# Patient Record
Sex: MALE | Race: BLACK OR AFRICAN AMERICAN | NOT HISPANIC OR LATINO | Employment: FULL TIME | URBAN - METROPOLITAN AREA
[De-identification: names, ages, dates, MRNs, and addresses within clinical notes are randomized per-mention and may not be internally consistent; named-entity substitution may affect disease eponyms.]

---

## 2021-08-10 ENCOUNTER — HOSPITAL ENCOUNTER (EMERGENCY)
Facility: MEDICAL CENTER | Age: 51
End: 2021-08-10
Attending: EMERGENCY MEDICINE
Payer: COMMERCIAL

## 2021-08-10 VITALS
SYSTOLIC BLOOD PRESSURE: 113 MMHG | DIASTOLIC BLOOD PRESSURE: 74 MMHG | TEMPERATURE: 98 F | WEIGHT: 215.39 LBS | HEART RATE: 63 BPM | OXYGEN SATURATION: 97 % | RESPIRATION RATE: 16 BRPM | BODY MASS INDEX: 30.15 KG/M2 | HEIGHT: 71 IN

## 2021-08-10 DIAGNOSIS — K43.9 VENTRAL HERNIA WITHOUT OBSTRUCTION OR GANGRENE: ICD-10-CM

## 2021-08-10 PROCEDURE — 99283 EMERGENCY DEPT VISIT LOW MDM: CPT

## 2021-08-10 RX ORDER — IBUPROFEN 600 MG/1
600 TABLET ORAL
Qty: 20 TABLET | Refills: 0 | Status: SHIPPED | OUTPATIENT
Start: 2021-08-10

## 2021-08-10 RX ORDER — OMEPRAZOLE 20 MG/1
20 CAPSULE, DELAYED RELEASE ORAL DAILY
Qty: 20 CAPSULE | Refills: 0 | Status: SHIPPED | OUTPATIENT
Start: 2021-08-10

## 2021-08-10 NOTE — ED TRIAGE NOTES
"Chief Complaint   Patient presents with   • Hernia     Above umbilicus X 3 years, X 2 days hernia is getting larger and painful, pt is able to reduce but it often goes in and out spontaneously. No changes in bowel movements, denies vomiting.      Pt ambulatory to triage for above complaints, VSS on RA, GCS 15, NAD.    Denies all s/sx of covid, denies recent travel, denies fevers.    [facility]. Educated on triage process and to inform staff of any changes.     /83   Pulse 85   Temp 36.7 °C (98 °F) (Temporal)   Resp 16   Ht 1.803 m (5' 11\")   Wt 97.7 kg (215 lb 6.2 oz)   SpO2 95%   BMI 30.04 kg/m²      "

## 2021-08-10 NOTE — ED PROVIDER NOTES
"ED Provider Note    CHIEF COMPLAINT  Chief Complaint   Patient presents with   • Hernia     Above umbilicus X 3 years, X 2 days hernia is getting larger and painful, pt is able to reduce but it often goes in and out spontaneously. No changes in bowel movements, denies vomiting.        HPI  Nadeen Galindo is a 50 y.o. male who presents complaining of problem of his umbilical hernia.  This was first diagnosed several years ago.  However, he never followed up, did not think too much of it.  However, he is recently started a more physical job with heavy lifting, and he feels like it is getting more uncomfortable.  Describes pain around the hernia which radiates.  He still has a sensation of fat coming out of it, but is able to push it back, just feels uncomfortable.  He has been eating fairly healthy.  He intentionally has lost a fair bit of weight.  He has never had surgery.  Is been no change in bowel or bladder.  No problems with his appetite.  No fever chills.  There is no other complaint.    PAST MEDICAL HISTORY  None    FAMILY HISTORY  No family history on file.    SOCIAL HISTORY  He is just moved here to the area.  Not yet established primary care.    SURGICAL HISTORY  No past surgical history on file.    CURRENT MEDICATIONS  Home Medications     Reviewed by Krish Jameson R.N. (Registered Nurse) on 08/10/21 at 1212  Med List Status: Partial   Medication Last Dose Status        Patient Jose Taking any Medications                       I have reviewed the nurses notes and/or the list brought with the patient.    ALLERGIES  No Known Allergies    REVIEW OF SYSTEMS  See HPI for further details. Review of systems as above, otherwise all other systems are negative.     PHYSICAL EXAM  VITAL SIGNS: /74   Pulse 63   Temp 36.7 °C (98 °F) (Temporal)   Resp 16   Ht 1.803 m (5' 11\")   Wt 97.7 kg (215 lb 6.2 oz)   SpO2 97%   BMI 30.04 kg/m²     Constitutional: Well appearing patient in no acute distress.  Not " toxic, nor ill in appearance.  HENT: Mucus membranes moist.   Eyes: Pupils equally round.  No scleral icterus.   Neck: Full nontender range of motion.  Lymphatic: No cervical lymphadenopathy noted.   Cardiovascular: Regular heart rate and rhythm.  No murmurs, rubs, nor gallop appreciated.   Thorax & Lungs: Chest is nontender.  Lungs are clear to auscultation with good air movement bilaterally.  No wheeze, rhonchi, nor rales.   Abdomen: Soft, with no mass, rebound or guarding.  Midway between the xiphoid process and the umbilicus, I feel a fascial defect in the middle of the belly consistent with hernia.  There is no fat or bowel incarceration presently.  When he sits up, this does open up, however once again I suspect reduces easily.  Spontaneously  Skin: No purpura nor petechia noted.  Extremities/Musculoskeletal: No sign of trauma.   Neurologic: Alert & oriented.  Strength and sensation is intact all around.  Gait is normal.  Psychiatric: Normal affect appropriate for the clinical situation.    MEDICAL RECORD  I have reviewed patient's medical record and pertinent results are listed above.    COURSE & MEDICAL DECISION MAKING  I have reviewed any medical record information, laboratory studies and radiographic results as noted above.  Patient with history of ventral hernia presents with worsening pain in the hernia area.  There is no evidence of incarceration at this time.   We talked about incarceration and return precautions.  I did call and talk the case over with Dr. Santana, who is happy to see him in the outpatient setting to get this fixed.  Asked him to call today or first in the morning to set this up.  In the interim, I have recommended no heavy lifting over 15 pounds in the next 2 weeks, he was given a note for this.  Of also will put him on ibuprofen for pain/inflammation, and like him to take Prilosec while doing so.  Prescriptions for both of these were provided.  Generic instructions on ventral  hernia      FINAL IMPRESSION  1. Ventral hernia without obstruction or gangrene           This dictation was created using voice recognition software.    Electronically signed by: Mandeep Marks M.D., 8/10/2021 3:03 PM

## 2021-08-10 NOTE — DISCHARGE INSTRUCTIONS
No heavy lifting.  Ibuprofen for inflammation/pain, Prilosec while taking this to avoid stomach.  Call today or first thing in the morning to establish an appointment with Dr. Santana.  Let the office know that we talked today in the ER and that she is expecting your phone call.